# Patient Record
Sex: FEMALE | Race: WHITE | NOT HISPANIC OR LATINO
[De-identification: names, ages, dates, MRNs, and addresses within clinical notes are randomized per-mention and may not be internally consistent; named-entity substitution may affect disease eponyms.]

---

## 2018-01-15 ENCOUNTER — TRANSCRIPTION ENCOUNTER (OUTPATIENT)
Age: 38
End: 2018-01-15

## 2018-03-13 ENCOUNTER — APPOINTMENT (OUTPATIENT)
Dept: OBGYN | Facility: CLINIC | Age: 38
End: 2018-03-13

## 2019-05-03 ENCOUNTER — APPOINTMENT (OUTPATIENT)
Dept: INTERNAL MEDICINE | Facility: CLINIC | Age: 39
End: 2019-05-03
Payer: COMMERCIAL

## 2019-05-03 VITALS
OXYGEN SATURATION: 98 % | HEIGHT: 63 IN | BODY MASS INDEX: 18.78 KG/M2 | SYSTOLIC BLOOD PRESSURE: 110 MMHG | WEIGHT: 106 LBS | HEART RATE: 75 BPM | DIASTOLIC BLOOD PRESSURE: 70 MMHG

## 2019-05-03 DIAGNOSIS — Z63.4 DISAPPEARANCE AND DEATH OF FAMILY MEMBER: ICD-10-CM

## 2019-05-03 LAB
ALBUMIN SERPL ELPH-MCNC: 5 G/DL
ALP BLD-CCNC: 40 U/L
ALT SERPL-CCNC: 13 U/L
ANION GAP SERPL CALC-SCNC: 14 MMOL/L
AST SERPL-CCNC: 15 U/L
BASOPHILS # BLD AUTO: 0.02 K/UL
BASOPHILS NFR BLD AUTO: 0.4 %
BILIRUB SERPL-MCNC: 0.6 MG/DL
BUN SERPL-MCNC: 13 MG/DL
CALCIUM SERPL-MCNC: 10.5 MG/DL
CHLORIDE SERPL-SCNC: 102 MMOL/L
CHOLEST SERPL-MCNC: 205 MG/DL
CHOLEST/HDLC SERPL: 2.6 RATIO
CO2 SERPL-SCNC: 23 MMOL/L
CREAT SERPL-MCNC: 0.9 MG/DL
EOSINOPHIL # BLD AUTO: 0.13 K/UL
EOSINOPHIL NFR BLD AUTO: 2.6 %
GLUCOSE SERPL-MCNC: 79 MG/DL
HCT VFR BLD CALC: 41.2 %
HDLC SERPL-MCNC: 78 MG/DL
HGB BLD-MCNC: 13.9 G/DL
IMM GRANULOCYTES NFR BLD AUTO: 0 %
LDLC SERPL CALC-MCNC: 117 MG/DL
LYMPHOCYTES # BLD AUTO: 1.56 K/UL
LYMPHOCYTES NFR BLD AUTO: 30.6 %
MAN DIFF?: NORMAL
MCHC RBC-ENTMCNC: 30.6 PG
MCHC RBC-ENTMCNC: 33.7 GM/DL
MCV RBC AUTO: 90.7 FL
MONOCYTES # BLD AUTO: 0.39 K/UL
MONOCYTES NFR BLD AUTO: 7.7 %
NEUTROPHILS # BLD AUTO: 2.99 K/UL
NEUTROPHILS NFR BLD AUTO: 58.7 %
PLATELET # BLD AUTO: 231 K/UL
POTASSIUM SERPL-SCNC: 4.9 MMOL/L
PROT SERPL-MCNC: 7.8 G/DL
RBC # BLD: 4.54 M/UL
RBC # FLD: 13.1 %
SODIUM SERPL-SCNC: 139 MMOL/L
TRIGL SERPL-MCNC: 51 MG/DL
TSH SERPL-ACNC: 0.83 UIU/ML
WBC # FLD AUTO: 5.09 K/UL

## 2019-05-03 PROCEDURE — 99395 PREV VISIT EST AGE 18-39: CPT

## 2019-05-03 PROCEDURE — G0444 DEPRESSION SCREEN ANNUAL: CPT

## 2019-05-03 SDOH — SOCIAL STABILITY - SOCIAL INSECURITY: DISSAPEARANCE AND DEATH OF FAMILY MEMBER: Z63.4

## 2019-05-03 NOTE — COUNSELING
[Healthy eating counseling provided] : healthy eating [ - Annual Depression Screening] : Annual Depression Screening

## 2019-05-03 NOTE — PHYSICAL EXAM
[No Acute Distress] : no acute distress [Well Developed] : well developed [Well Nourished] : well nourished [Normal Sclera/Conjunctiva] : normal sclera/conjunctiva [PERRL] : pupils equal round and reactive to light [Well-Appearing] : well-appearing [EOMI] : extraocular movements intact [Normal Outer Ear/Nose] : the outer ears and nose were normal in appearance [No JVD] : no jugular venous distention [Supple] : supple [Normal Oropharynx] : the oropharynx was normal [No Lymphadenopathy] : no lymphadenopathy [Thyroid Normal, No Nodules] : the thyroid was normal and there were no nodules present [No Respiratory Distress] : no respiratory distress  [Clear to Auscultation] : lungs were clear to auscultation bilaterally [Normal Rate] : normal rate  [No Accessory Muscle Use] : no accessory muscle use [Regular Rhythm] : with a regular rhythm [No Murmur] : no murmur heard [Normal S1, S2] : normal S1 and S2 [No Carotid Bruits] : no carotid bruits [No Abdominal Bruit] : a ~M bruit was not heard ~T in the abdomen [No Varicosities] : no varicosities [Pedal Pulses Present] : the pedal pulses are present [No Edema] : there was no peripheral edema [No Extremity Clubbing/Cyanosis] : no extremity clubbing/cyanosis [No Palpable Aorta] : no palpable aorta [Normal Appearance] : normal in appearance [No Nipple Discharge] : no nipple discharge [No Axillary Lymphadenopathy] : no axillary lymphadenopathy [Non Tender] : non-tender [Soft] : abdomen soft [No Masses] : no abdominal mass palpated [Non-distended] : non-distended [Normal Bowel Sounds] : normal bowel sounds [No HSM] : no HSM [Normal Posterior Cervical Nodes] : no posterior cervical lymphadenopathy [Normal Anterior Cervical Nodes] : no anterior cervical lymphadenopathy [No Spinal Tenderness] : no spinal tenderness [No CVA Tenderness] : no CVA  tenderness [No Joint Swelling] : no joint swelling [Grossly Normal Strength/Tone] : grossly normal strength/tone [No Rash] : no rash [Coordination Grossly Intact] : coordination grossly intact [Normal Gait] : normal gait [Deep Tendon Reflexes (DTR)] : deep tendon reflexes were 2+ and symmetric [No Focal Deficits] : no focal deficits [Normal Affect] : the affect was normal [Normal Insight/Judgement] : insight and judgment were intact

## 2019-05-05 LAB
HCV AB SER QL: NONREACTIVE
HCV S/CO RATIO: 0.12 S/CO
HIV1+2 AB SPEC QL IA.RAPID: NONREACTIVE

## 2019-05-06 ENCOUNTER — TRANSCRIPTION ENCOUNTER (OUTPATIENT)
Age: 39
End: 2019-05-06

## 2019-05-22 NOTE — ASSESSMENT
[FreeTextEntry1] :         \par        1. HCM-  HIV testing-- tested in past (pregnancy) and negative- offered and accepted\par                 immunizations ok- had Tdap at daughter's pediatrician in 3/12- and with last pregnancy 2013\par                 pap smear recently-h/o HPV- since teenage years- reports neg PAP and HPV 3/19- Dr Denney\par                 derm f/u routine   \par                  requests labs\par     \par       2. 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 ? kidney issue- stones?? Medullary sponge kidney- will retrieve records from Dr Jesus\par \par \par    f/u 1-2 years or prn

## 2019-05-22 NOTE — HISTORY OF PRESENT ILLNESS
[de-identified] : 39 yo generally healthy last seen almost 3 years ago. At time  had recently  leaving patient with two young children.\par \par Appt made for routine purposes. Feels well.  No complaints

## 2019-05-22 NOTE — HEALTH RISK ASSESSMENT
[Excellent] : ~his/her~  mood as  excellent [0] : 2) Feeling down, depressed, or hopeless: Not at all (0) [Patient reported PAP Smear was normal] : Patient reported PAP Smear was normal [HIV Test offered] : HIV Test offered [Hepatitis C test offered] : Hepatitis C test offered [None] : None [With Family] : lives with family [Graduate School] : graduate school [# Of Children ___] : has [unfilled] children [Sexually Active] : sexually active [Fully functional (bathing, dressing, toileting, transferring, walking, feeding)] : Fully functional (bathing, dressing, toileting, transferring, walking, feeding) [Feels Safe at Home] : Feels safe at home [Fully functional (using the telephone, shopping, preparing meals, housekeeping, doing laundry, using] : Fully functional and needs no help or supervision to perform IADLs (using the telephone, shopping, preparing meals, housekeeping, doing laundry, using transportation, managing medications and managing finances) [Smoke Detector] : smoke detector [Seat Belt] :  uses seat belt [Designated Healthcare Proxy] : Designated healthcare proxy [Name: ___] : Health Care Proxy's Name: [unfilled]  [I will adhere to the patient's wishes as expressed in the advance directive except as noted below.] : I will adhere to the patient's wishes as expressed in the advance directive except as noted below [] : No [de-identified] : No ED or hosp [de-identified] : Gyn- Dr Denney; ENT- Dr Roberts- sinus; Dr Jesus- caren acosta; Derm [de-identified] : occasional glass of wine at night [de-identified] : Swims twice weekly- [place] [[id number]] : 0 [Change in mental status noted] : No change in mental status noted [Language] : denies difficulty with language [Behavior] : denies difficulty with behavior [Learning/Retaining New Information] : denies difficulty learning/retaining new information [Handling Complex Tasks] : denies difficulty handling complex tasks [Reasoning] : denies difficulty with reasoning [Spatial Ability and Orientation] : denies difficulty with spatial ability and orientation [Reports changes in hearing] : Reports no changes in hearing [Reports changes in vision] : Reports no changes in vision [Reports changes in dental health] : Reports no changes in dental health [Guns at Home] : no guns at home [PapSmearComments] : March 2019- Dr Denney [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

## 2020-05-13 ENCOUNTER — APPOINTMENT (OUTPATIENT)
Dept: INTERNAL MEDICINE | Facility: CLINIC | Age: 40
End: 2020-05-13
Payer: COMMERCIAL

## 2020-05-13 PROCEDURE — 99214 OFFICE O/P EST MOD 30 MIN: CPT | Mod: 95

## 2020-05-13 NOTE — REASON FOR VISIT
[Other Location: e.g. Home (Enter Location, City,State)___] : at [unfilled] [Patient] : the patient [Annual Wellness Visit] : an annual wellness visit [Home] : at home, [unfilled] , at the time of the visit. [Self] : self

## 2020-05-21 LAB
BASOPHILS # BLD AUTO: 0.02 K/UL
BASOPHILS NFR BLD AUTO: 0.4 %
EOSINOPHIL # BLD AUTO: 0.14 K/UL
EOSINOPHIL NFR BLD AUTO: 2.8 %
HCT VFR BLD CALC: 42.1 %
HGB BLD-MCNC: 13.5 G/DL
IMM GRANULOCYTES NFR BLD AUTO: 0.2 %
LYMPHOCYTES # BLD AUTO: 1.53 K/UL
LYMPHOCYTES NFR BLD AUTO: 30.1 %
MAN DIFF?: NORMAL
MCHC RBC-ENTMCNC: 30.5 PG
MCHC RBC-ENTMCNC: 32.1 GM/DL
MCV RBC AUTO: 95 FL
MONOCYTES # BLD AUTO: 0.42 K/UL
MONOCYTES NFR BLD AUTO: 8.3 %
NEUTROPHILS # BLD AUTO: 2.96 K/UL
NEUTROPHILS NFR BLD AUTO: 58.2 %
PLATELET # BLD AUTO: 201 K/UL
RBC # BLD: 4.43 M/UL
RBC # FLD: 13.5 %
WBC # FLD AUTO: 5.08 K/UL

## 2020-05-22 LAB
ALBUMIN SERPL ELPH-MCNC: 4.6 G/DL
ALP BLD-CCNC: 29 U/L
ALT SERPL-CCNC: 14 U/L
ANION GAP SERPL CALC-SCNC: 13 MMOL/L
AST SERPL-CCNC: 17 U/L
BILIRUB SERPL-MCNC: 0.6 MG/DL
BUN SERPL-MCNC: 13 MG/DL
CALCIUM SERPL-MCNC: 9.6 MG/DL
CHLORIDE SERPL-SCNC: 103 MMOL/L
CHOLEST SERPL-MCNC: 194 MG/DL
CHOLEST/HDLC SERPL: 2.3 RATIO
CO2 SERPL-SCNC: 24 MMOL/L
CREAT SERPL-MCNC: 0.82 MG/DL
GLUCOSE SERPL-MCNC: 89 MG/DL
HDLC SERPL-MCNC: 85 MG/DL
LDLC SERPL CALC-MCNC: 96 MG/DL
POTASSIUM SERPL-SCNC: 4.5 MMOL/L
PROT SERPL-MCNC: 6.9 G/DL
SARS-COV-2 IGG SERPL IA-ACNC: <0.1 INDEX
SARS-COV-2 IGG SERPL QL IA: NEGATIVE
SODIUM SERPL-SCNC: 139 MMOL/L
T4 FREE SERPL-MCNC: 1 NG/DL
TRIGL SERPL-MCNC: 67 MG/DL
TSH SERPL-ACNC: 1.9 UIU/ML

## 2020-05-22 NOTE — ASSESSMENT
[FreeTextEntry1] :  1. HCM- HIV testing-- tested in past (pregnancy) and May 2019 as routine\par  immunizations ok- had Tdap at daughter's pediatrician in 3/12- and with last pregnancy 2013\par  pap smear recently-h/o HPV- since teenage years- reports neg PAP and HPV 3/19- Dr Denney- upcoming appt for August 2020- would consider OCP for heavy menses- check TSH and CBC\par  derm f/u routine \par Too young for mammo\par  \par  2. 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 / Kidney stones- ? Medullary SPonge Kidney- isue discussed Has been asx Does drink a lot of water, etc- monitor- was advised sono but not interested in f/u\par \par f/u on eyear or prn- will order labs and call when results received\par \par

## 2020-05-22 NOTE — REVIEW OF SYSTEMS
[Negative] : Heme/Lymph [Nasal Discharge] : nasal discharge [FreeTextEntry4] : occas congestoin due to allergies [FreeTextEntry8] : menses heavier than usual first few days of month- considering OCP [FreeTextEntry1] : Has had two mammos in life- gyn advised

## 2020-05-22 NOTE — HEALTH RISK ASSESSMENT
[Excellent] : ~his/her~  mood as  excellent [0] : 2) Feeling down, depressed, or hopeless: Not at all (0) [Patient reported PAP Smear was normal] : Patient reported PAP Smear was normal [HIV Test offered] : HIV Test offered [Hepatitis C test offered] : Hepatitis C test offered [None] : None [With Family] : lives with family [Graduate School] : graduate school [# Of Children ___] : has [unfilled] children [Sexually Active] : sexually active [Fully functional (using the telephone, shopping, preparing meals, housekeeping, doing laundry, using] : Fully functional and needs no help or supervision to perform IADLs (using the telephone, shopping, preparing meals, housekeeping, doing laundry, using transportation, managing medications and managing finances) [Fully functional (bathing, dressing, toileting, transferring, walking, feeding)] : Fully functional (bathing, dressing, toileting, transferring, walking, feeding) [Feels Safe at Home] : Feels safe at home [Smoke Detector] : smoke detector [Seat Belt] :  uses seat belt [Designated Healthcare Proxy] : Designated healthcare proxy [Name: ___] : Health Care Proxy's Name: [unfilled]  [I will adhere to the patient's wishes as expressed in the advance directive except as noted below.] : I will adhere to the patient's wishes as expressed in the advance directive except as noted below [4 or more  times a week (4 pts)] : 4 or more  times a week (4 points) [1 or 2 (0 pts)] : 1 or 2 (0 points) [No] : In the past 12 months have you used drugs other than those required for medical reasons? No [Never (0 pts)] : Never (0 points) [No falls in past year] : Patient reported no falls in the past year [Reports normal functional visual acuity (ie: able to read med bottle)] : Reports normal functional visual acuity [Reviewed no changes] : Reviewed no changes [] : No [de-identified] : No ED or hosp [de-identified] : Gyn- Dr Denney 3/19 last- upcoming apt;  Dr Rasmussen ENT; Derm Dr Sheriff- routine wurveillance [de-identified] : occasional glass of wine at night- up to once nightly [de-identified] : Rowing MaCHING sWIMS WHEN ABLE [[id number]] : 0 [de-identified] : Healthy- weight stable by report [Change in mental status noted] : No change in mental status noted [Language] : denies difficulty with language [Behavior] : denies difficulty with behavior [Learning/Retaining New Information] : denies difficulty learning/retaining new information [Handling Complex Tasks] : denies difficulty handling complex tasks [Reasoning] : denies difficulty with reasoning [Spatial Ability and Orientation] : denies difficulty with spatial ability and orientation [Reports changes in hearing] : Reports no changes in hearing [Reports changes in vision] : Reports no changes in vision [Reports changes in dental health] : Reports no changes in dental health [Guns at Home] : no guns at home [PapSmearComments] : March 2019- Dr Denney [MammogramComments] : n/a [BoneDensityComments] : n/a [ColonoscopyComments] : n/a [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

## 2020-05-22 NOTE — HISTORY OF PRESENT ILLNESS
[de-identified] : gENERALLY HEALTHY 40 YO LAST SEEN ONE YEAR AGO FOR ROUTINE WELLNESS VISIT/CPE\par \par Patient has been generally well without any significant intercurrent issues or problems. Adherent with medications as noted without side effects. Appetite good and weight reportedly stable. Active with good exercise tolerance. No sx of chest pain, sob, palpitations, orthopnea, PND, PIEDRA, edema, lightheadedness..\par

## 2020-08-07 ENCOUNTER — TRANSCRIPTION ENCOUNTER (OUTPATIENT)
Age: 40
End: 2020-08-07

## 2020-12-28 ENCOUNTER — TRANSCRIPTION ENCOUNTER (OUTPATIENT)
Age: 40
End: 2020-12-28

## 2021-01-19 ENCOUNTER — TRANSCRIPTION ENCOUNTER (OUTPATIENT)
Age: 41
End: 2021-01-19

## 2021-03-18 ENCOUNTER — NON-APPOINTMENT (OUTPATIENT)
Age: 41
End: 2021-03-18

## 2021-03-19 ENCOUNTER — APPOINTMENT (OUTPATIENT)
Dept: INTERNAL MEDICINE | Facility: CLINIC | Age: 41
End: 2021-03-19
Payer: COMMERCIAL

## 2021-03-19 VITALS
SYSTOLIC BLOOD PRESSURE: 102 MMHG | OXYGEN SATURATION: 99 % | HEART RATE: 64 BPM | HEIGHT: 63 IN | BODY MASS INDEX: 19.31 KG/M2 | DIASTOLIC BLOOD PRESSURE: 60 MMHG | WEIGHT: 109 LBS

## 2021-03-19 VITALS — SYSTOLIC BLOOD PRESSURE: 108 MMHG | HEART RATE: 60 BPM | RESPIRATION RATE: 12 BRPM | DIASTOLIC BLOOD PRESSURE: 70 MMHG

## 2021-03-19 LAB
BASOPHILS # BLD AUTO: 0.03 K/UL
BASOPHILS NFR BLD AUTO: 0.6 %
EOSINOPHIL # BLD AUTO: 0.09 K/UL
EOSINOPHIL NFR BLD AUTO: 1.8 %
HCT VFR BLD CALC: 42.3 %
HGB BLD-MCNC: 14.1 G/DL
IMM GRANULOCYTES NFR BLD AUTO: 0.2 %
LYMPHOCYTES # BLD AUTO: 1.91 K/UL
LYMPHOCYTES NFR BLD AUTO: 37.5 %
MAN DIFF?: NORMAL
MCHC RBC-ENTMCNC: 30.5 PG
MCHC RBC-ENTMCNC: 33.3 GM/DL
MCV RBC AUTO: 91.4 FL
MONOCYTES # BLD AUTO: 0.38 K/UL
MONOCYTES NFR BLD AUTO: 7.5 %
NEUTROPHILS # BLD AUTO: 2.67 K/UL
NEUTROPHILS NFR BLD AUTO: 52.4 %
PLATELET # BLD AUTO: 200 K/UL
RBC # BLD: 4.63 M/UL
RBC # FLD: 12.9 %
WBC # FLD AUTO: 5.09 K/UL

## 2021-03-19 PROCEDURE — G0444 DEPRESSION SCREEN ANNUAL: CPT | Mod: NC,59

## 2021-03-19 PROCEDURE — 99072 ADDL SUPL MATRL&STAF TM PHE: CPT

## 2021-03-19 PROCEDURE — 99395 PREV VISIT EST AGE 18-39: CPT

## 2021-03-19 NOTE — PHYSICAL EXAM
[No Acute Distress] : no acute distress [Well Nourished] : well nourished [Well Developed] : well developed [Well-Appearing] : well-appearing [Normal Sclera/Conjunctiva] : normal sclera/conjunctiva [PERRL] : pupils equal round and reactive to light [EOMI] : extraocular movements intact [Normal Outer Ear/Nose] : the outer ears and nose were normal in appearance [Normal Oropharynx] : the oropharynx was normal [No JVD] : no jugular venous distention [No Lymphadenopathy] : no lymphadenopathy [Supple] : supple [Thyroid Normal, No Nodules] : the thyroid was normal and there were no nodules present [No Respiratory Distress] : no respiratory distress  [No Accessory Muscle Use] : no accessory muscle use [Clear to Auscultation] : lungs were clear to auscultation bilaterally [Normal Rate] : normal rate  [Regular Rhythm] : with a regular rhythm [Normal S1, S2] : normal S1 and S2 [No Murmur] : no murmur heard [No Carotid Bruits] : no carotid bruits [No Abdominal Bruit] : a ~M bruit was not heard ~T in the abdomen [No Varicosities] : no varicosities [Pedal Pulses Present] : the pedal pulses are present [No Edema] : there was no peripheral edema [No Palpable Aorta] : no palpable aorta [No Extremity Clubbing/Cyanosis] : no extremity clubbing/cyanosis [Normal] : no carotid or abdominal bruits heard, no varicosities, pedal pulses are present, no peripheral edema, no extremity clubbing or cyanosis and no palpable aorta [Normal Appearance] : normal in appearance [No Nipple Discharge] : no nipple discharge [No Axillary Lymphadenopathy] : no axillary lymphadenopathy [Soft] : abdomen soft [Non Tender] : non-tender [Non-distended] : non-distended [No Masses] : no abdominal mass palpated [No HSM] : no HSM [Normal Bowel Sounds] : normal bowel sounds [Normal Posterior Cervical Nodes] : no posterior cervical lymphadenopathy [Normal Anterior Cervical Nodes] : no anterior cervical lymphadenopathy [No CVA Tenderness] : no CVA  tenderness [No Spinal Tenderness] : no spinal tenderness [No Joint Swelling] : no joint swelling [Grossly Normal Strength/Tone] : grossly normal strength/tone [No Rash] : no rash [Coordination Grossly Intact] : coordination grossly intact [No Focal Deficits] : no focal deficits [Normal Gait] : normal gait [Deep Tendon Reflexes (DTR)] : deep tendon reflexes were 2+ and symmetric [Normal Affect] : the affect was normal [Normal Insight/Judgement] : insight and judgment were intact

## 2021-03-22 ENCOUNTER — TRANSCRIPTION ENCOUNTER (OUTPATIENT)
Age: 41
End: 2021-03-22

## 2021-03-22 LAB
ALBUMIN SERPL ELPH-MCNC: 4.8 G/DL
ALP BLD-CCNC: 34 U/L
ALT SERPL-CCNC: 10 U/L
ANION GAP SERPL CALC-SCNC: 10 MMOL/L
AST SERPL-CCNC: 17 U/L
BILIRUB SERPL-MCNC: 0.5 MG/DL
BUN SERPL-MCNC: 17 MG/DL
CALCIUM SERPL-MCNC: 10 MG/DL
CHLORIDE SERPL-SCNC: 101 MMOL/L
CHOLEST SERPL-MCNC: 203 MG/DL
CO2 SERPL-SCNC: 26 MMOL/L
CREAT SERPL-MCNC: 0.88 MG/DL
GLUCOSE SERPL-MCNC: 90 MG/DL
HDLC SERPL-MCNC: 93 MG/DL
LDLC SERPL CALC-MCNC: 102 MG/DL
NONHDLC SERPL-MCNC: 110 MG/DL
POTASSIUM SERPL-SCNC: 4.7 MMOL/L
PROT SERPL-MCNC: 7.4 G/DL
SODIUM SERPL-SCNC: 137 MMOL/L
TRIGL SERPL-MCNC: 39 MG/DL
TSH SERPL-ACNC: 1.49 UIU/ML

## 2021-03-22 NOTE — REVIEW OF SYSTEMS
[Nasal Discharge] : nasal discharge [Negative] : Heme/Lymph [FreeTextEntry4] : occas congestion due to allergies [FreeTextEntry1] : Has had two mammos in life- gyn advised

## 2021-03-22 NOTE — HISTORY OF PRESENT ILLNESS
[de-identified] : Last seen less than one year ago virtually. Patient has been generally well without any significant intercurrent issues or problems. Adherent with medications as noted without side effects. Appetite good and weight reportedly stable. Active with good exercise tolerance. 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

## 2021-03-22 NOTE — ASSESSMENT
[FreeTextEntry1] : 1. HCM- HIV testing-- tested in past (pregnancy) and May 2019 as routine- no additional risk\par  immunizations ok- had Tdap at daughter's pediatrician in 3/12- and with last pregnancy 2013- and covid vaccine and flu shot this season\par  pap smear recently-h/o HPV- since teenage years- reports neg PAP and HPV 3/19- Dr Denney- and again June 2020\par  derm f/u routine \par Too young for mammo- start at ae 42\par  \par  2. 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 / Kidney stones- ? Medullary SPonge Kidney- isue discussed Has been asx Does drink a lot of water, etc- monitor- was advised sono but not interested in f/u\par \par f/u one year or prn- will order labs and call when results received\par \par

## 2021-03-22 NOTE — HEALTH RISK ASSESSMENT
[Excellent] : ~his/her~  mood as  excellent [4 or more  times a week (4 pts)] : 4 or more  times a week (4 points) [1 or 2 (0 pts)] : 1 or 2 (0 points) [Never (0 pts)] : Never (0 points) [No] : In the past 12 months have you used drugs other than those required for medical reasons? No [No falls in past year] : Patient reported no falls in the past year [0] : 2) Feeling down, depressed, or hopeless: Not at all (0) [Patient reported PAP Smear was normal] : Patient reported PAP Smear was normal [None] : None [With Family] : lives with family [Graduate School] : graduate school [# Of Children ___] : has [unfilled] children [Sexually Active] : sexually active [Feels Safe at Home] : Feels safe at home [Fully functional (bathing, dressing, toileting, transferring, walking, feeding)] : Fully functional (bathing, dressing, toileting, transferring, walking, feeding) [Fully functional (using the telephone, shopping, preparing meals, housekeeping, doing laundry, using] : Fully functional and needs no help or supervision to perform IADLs (using the telephone, shopping, preparing meals, housekeeping, doing laundry, using transportation, managing medications and managing finances) [Reports normal functional visual acuity (ie: able to read med bottle)] : Reports normal functional visual acuity [Smoke Detector] : smoke detector [Seat Belt] :  uses seat belt [Reviewed no changes] : Reviewed no changes [Designated Healthcare Proxy] : Designated healthcare proxy [Name: ___] : Health Care Proxy's Name: [unfilled]  [I will adhere to the patient's wishes as expressed in the advance directive except as noted below.] : I will adhere to the patient's wishes as expressed in the advance directive except as noted below [HIV test declined] : HIV test declined [Hepatitis C test declined] : Hepatitis C test declined [FreeTextEntry1] : None [] : No [de-identified] : No ED or hosp [de-identified] : Gyn- Dr Denney June 2020 last; Derm Dr Sheriff- routine Aug 202 [de-identified] : occasional glass of wine at night- up to once nightly [de-identified] : [employer]- daily 30 minutes [de-identified] : Healthy- weight stable by report- exceelent exercise [[id number]] : 0 [Change in mental status noted] : No change in mental status noted [Language] : denies difficulty with language [Behavior] : denies difficulty with behavior [Learning/Retaining New Information] : denies difficulty learning/retaining new information [Handling Complex Tasks] : denies difficulty handling complex tasks [Reasoning] : denies difficulty with reasoning [Spatial Ability and Orientation] : denies difficulty with spatial ability and orientation [Reports changes in hearing] : Reports no changes in hearing [Reports changes in vision] : Reports no changes in vision [Reports changes in dental health] : Reports no changes in dental health [Guns at Home] : no guns at home [Travel to Developing Areas] : does not  travel to developing areas [MammogramComments] : n/a [PapSmearComments] : June 2020- Dr Denney [BoneDensityComments] : n/a [ColonoscopyComments] : n/a [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

## 2021-06-24 ENCOUNTER — TRANSCRIPTION ENCOUNTER (OUTPATIENT)
Age: 41
End: 2021-06-24

## 2022-05-13 ENCOUNTER — APPOINTMENT (OUTPATIENT)
Dept: INTERNAL MEDICINE | Facility: CLINIC | Age: 42
End: 2022-05-13
Payer: COMMERCIAL

## 2022-05-13 ENCOUNTER — LABORATORY RESULT (OUTPATIENT)
Age: 42
End: 2022-05-13

## 2022-05-13 VITALS
WEIGHT: 112 LBS | HEIGHT: 63 IN | OXYGEN SATURATION: 98 % | SYSTOLIC BLOOD PRESSURE: 106 MMHG | DIASTOLIC BLOOD PRESSURE: 70 MMHG | BODY MASS INDEX: 19.84 KG/M2 | HEART RATE: 63 BPM

## 2022-05-13 DIAGNOSIS — Z11.59 ENCOUNTER FOR SCREENING FOR OTHER VIRAL DISEASES: ICD-10-CM

## 2022-05-13 LAB
ALBUMIN SERPL ELPH-MCNC: 5 G/DL
ALP BLD-CCNC: 42 U/L
ALT SERPL-CCNC: 14 U/L
ANION GAP SERPL CALC-SCNC: 13 MMOL/L
AST SERPL-CCNC: 19 U/L
BILIRUB SERPL-MCNC: 0.4 MG/DL
BUN SERPL-MCNC: 13 MG/DL
CALCIUM SERPL-MCNC: 10.3 MG/DL
CHLORIDE SERPL-SCNC: 100 MMOL/L
CO2 SERPL-SCNC: 25 MMOL/L
CREAT SERPL-MCNC: 0.96 MG/DL
EGFR: 77 ML/MIN/1.73M2
GLUCOSE SERPL-MCNC: 93 MG/DL
POTASSIUM SERPL-SCNC: 4.3 MMOL/L
PROT SERPL-MCNC: 7.5 G/DL
SODIUM SERPL-SCNC: 138 MMOL/L
TSH SERPL-ACNC: 0.62 UIU/ML

## 2022-05-13 PROCEDURE — 99395 PREV VISIT EST AGE 18-39: CPT

## 2022-05-16 ENCOUNTER — TRANSCRIPTION ENCOUNTER (OUTPATIENT)
Age: 42
End: 2022-05-16

## 2022-05-16 LAB
BASOPHILS # BLD AUTO: 0 K/UL
BASOPHILS NFR BLD AUTO: 0 %
CHOLEST SERPL-MCNC: 204 MG/DL
EOSINOPHIL # BLD AUTO: 0.25 K/UL
EOSINOPHIL NFR BLD AUTO: 6 %
HCT VFR BLD CALC: 41.5 %
HDLC SERPL-MCNC: 79 MG/DL
HGB BLD-MCNC: 13.8 G/DL
LDLC SERPL CALC-MCNC: 106 MG/DL
LYMPHOCYTES # BLD AUTO: 1.18 K/UL
LYMPHOCYTES NFR BLD AUTO: 28.2 %
MAN DIFF?: NORMAL
MCHC RBC-ENTMCNC: 30.8 PG
MCHC RBC-ENTMCNC: 33.3 GM/DL
MCV RBC AUTO: 92.6 FL
MONOCYTES # BLD AUTO: 0.53 K/UL
MONOCYTES NFR BLD AUTO: 12.8 %
NEUTROPHILS # BLD AUTO: 2.07 K/UL
NEUTROPHILS NFR BLD AUTO: 49.6 %
NONHDLC SERPL-MCNC: 125 MG/DL
PLATELET # BLD AUTO: 190 K/UL
RBC # BLD: 4.48 M/UL
RBC # FLD: 13.2 %
TRIGL SERPL-MCNC: 91 MG/DL
WBC # FLD AUTO: 4.17 K/UL

## 2022-05-16 NOTE — ASSESSMENT
[FreeTextEntry1] : \par 1. HCM- HIV testing-- tested in past (pregnancy) and May 2019 as routine- no additional risk\par  immunizations ok- had Tdap at daughter's pediatrician in 3/12- and with last pregnancy 2013- and covid vaccine + booster and flu shot this season\par  pap smear recently-h/o HPV- since teenage years- reports neg PAP and HPV 3/19- Dr Denney- and again June 2022- updoming\par  derm f/u routine every summer\par Mammo- Gyn ordered since aged 39- advised yearly til aged 52- order placed- due summer, 2022\par  \par \par 2. Kidney stones- ? Medullary Sponge Kidney- isue discussed Has been asx Does drink a lot of water, etc- monitor- was advised sono but not interested in f/u\par \par f/u one year or prn- will order labs and call when results received\par \par

## 2022-05-16 NOTE — HEALTH RISK ASSESSMENT
[Excellent] : ~his/her~  mood as  excellent [4 or more  times a week (4 pts)] : 4 or more  times a week (4 points) [1 or 2 (0 pts)] : 1 or 2 (0 points) [Never (0 pts)] : Never (0 points) [No] : In the past 12 months have you used drugs other than those required for medical reasons? No [No falls in past year] : Patient reported no falls in the past year [0] : 2) Feeling down, depressed, or hopeless: Not at all (0) [Patient reported PAP Smear was normal] : Patient reported PAP Smear was normal [HIV test declined] : HIV test declined [Hepatitis C test declined] : Hepatitis C test declined [None] : None [With Family] : lives with family [Graduate School] : graduate school [# Of Children ___] : has [unfilled] children [Sexually Active] : sexually active [Feels Safe at Home] : Feels safe at home [Fully functional (bathing, dressing, toileting, transferring, walking, feeding)] : Fully functional (bathing, dressing, toileting, transferring, walking, feeding) [Fully functional (using the telephone, shopping, preparing meals, housekeeping, doing laundry, using] : Fully functional and needs no help or supervision to perform IADLs (using the telephone, shopping, preparing meals, housekeeping, doing laundry, using transportation, managing medications and managing finances) [Reports normal functional visual acuity (ie: able to read med bottle)] : Reports normal functional visual acuity [Smoke Detector] : smoke detector [Seat Belt] :  uses seat belt [Never] : Never [PHQ-2 Negative - No further assessment needed] : PHQ-2 Negative - No further assessment needed [With Patient/Caregiver] : , with patient/caregiver [Reviewed no changes] : Reviewed, no changes [Designated Healthcare Proxy] : Designated healthcare proxy [Name: ___] : Health Care Proxy's Name: [unfilled]  [Time Spent: ___ minutes] : Time Spent: [unfilled] minutes [FreeTextEntry1] : None [de-identified] : No ED or hosp or urgicenter [de-identified] : Gyn- Dr Denney June 2021 last; Derm Dr Sheriff- routine Aug 2021 [de-identified] : occasional glass of wine at night- up to once nightly [de-identified] : [employer]- daily 30 minutes [de-identified] : Healthy- weight stable by report- exceelent exercise [[id number]] : 0 [Change in mental status noted] : No change in mental status noted [Language] : denies difficulty with language [Behavior] : denies difficulty with behavior [Learning/Retaining New Information] : denies difficulty learning/retaining new information [Handling Complex Tasks] : denies difficulty handling complex tasks [Reasoning] : denies difficulty with reasoning [Spatial Ability and Orientation] : denies difficulty with spatial ability and orientation [Reports changes in hearing] : Reports no changes in hearing [Reports changes in vision] : Reports no changes in vision [Reports changes in dental health] : Reports no changes in dental health [Guns at Home] : no guns at home [Travel to Developing Areas] : does not  travel to developing areas [MammogramComments] : n/a [PapSmearComments] : June 2020- Dr Denney [BoneDensityComments] : n/a [ColonoscopyComments] : n/a [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

## 2022-07-01 ENCOUNTER — TRANSCRIPTION ENCOUNTER (OUTPATIENT)
Age: 42
End: 2022-07-01

## 2022-07-27 ENCOUNTER — TRANSCRIPTION ENCOUNTER (OUTPATIENT)
Age: 42
End: 2022-07-27

## 2022-07-28 ENCOUNTER — TRANSCRIPTION ENCOUNTER (OUTPATIENT)
Age: 42
End: 2022-07-28

## 2022-07-28 LAB — PINWORM EXAM: NORMAL

## 2022-10-04 ENCOUNTER — TRANSCRIPTION ENCOUNTER (OUTPATIENT)
Age: 42
End: 2022-10-04

## 2022-12-14 ENCOUNTER — TRANSCRIPTION ENCOUNTER (OUTPATIENT)
Age: 42
End: 2022-12-14

## 2022-12-19 NOTE — PHYSICAL EXAM
[de-identified] : WDWN in NAD\par HEENT:  unremarkable\par Neck:  supple, no JVD, no LN\par Lungs:  CTA B/L, no W/R/R\par Heart:  Reg rate, +S1S2, no M/R/G\par Abdomen:  soft, NT, ND, +BS, no masses, no HS-megaly\par Genital: No pubic or genital lesions noted.\par Ext:  no C/C/E\par Neuro:  no focal deficits

## 2022-12-19 NOTE — REVIEW OF SYSTEMS
[FreeTextEntry2] : Constitutional:  no fever and no chills. \par Eyes:  no discharge. \par HEENT:  no earache. \par Cardiovascular:  no chest pain, no palpitations and no lower extremity edema. \par Respiratory:  no shortness of breath, no wheezing and no cough. \par Gastrointestinal:  no abdominal pain, no nausea and no vomiting. \par Genitourinary:  no dysuria. \par Musculoskeletal:  no joint pain. \par Integumentary:  no itching. \par Neurological:  no headache. \par Psychiatric:  not suicidal. \par Hematologic/Lymphatic:  no easy bleeding. [FreeTextEntry7] : see hpi

## 2022-12-19 NOTE — HISTORY OF PRESENT ILLNESS
[FreeTextEntry8] : BIANCA BUENROSTRO  is a 42 year old female  with history of  HLD, nephrolithiasis, HPV infection presented today for nausea, headache. \par Pt reported home COVID tested negative. \par

## 2022-12-22 ENCOUNTER — APPOINTMENT (OUTPATIENT)
Dept: INTERNAL MEDICINE | Facility: CLINIC | Age: 42
End: 2022-12-22

## 2022-12-22 NOTE — HISTORY OF PRESENT ILLNESS
[FreeTextEntry1] : nausea [de-identified] : BIANCA BUENROSTRO  is a 42 year old female  with history of  HLD, nephrolithiasis presented today for nausea.\par

## 2022-12-22 NOTE — PHYSICAL EXAM
[de-identified] : WDWN in NAD\par HEENT:  unremarkable\par Neck:  supple, no JVD, no LN\par Lungs:  CTA B/L, no W/R/R\par Heart:  Reg rate, +S1S2, no M/R/G\par Abdomen:  soft, NT, ND, +BS, no masses, no HS-megaly\par Genital: No pubic or genital lesions noted.\par Ext:  no C/C/E\par Neuro:  no focal deficits

## 2022-12-23 ENCOUNTER — APPOINTMENT (OUTPATIENT)
Dept: INTERNAL MEDICINE | Facility: CLINIC | Age: 42
End: 2022-12-23

## 2023-01-05 ENCOUNTER — APPOINTMENT (OUTPATIENT)
Dept: INTERNAL MEDICINE | Facility: CLINIC | Age: 43
End: 2023-01-05
Payer: COMMERCIAL

## 2023-01-05 VITALS
DIASTOLIC BLOOD PRESSURE: 80 MMHG | HEART RATE: 62 BPM | BODY MASS INDEX: 19.49 KG/M2 | WEIGHT: 110 LBS | SYSTOLIC BLOOD PRESSURE: 120 MMHG | HEIGHT: 63 IN | OXYGEN SATURATION: 98 %

## 2023-01-05 LAB — S PYO AG SPEC QL IA: NORMAL

## 2023-01-05 PROCEDURE — 87880 STREP A ASSAY W/OPTIC: CPT | Mod: QW

## 2023-01-05 PROCEDURE — 99214 OFFICE O/P EST MOD 30 MIN: CPT

## 2023-01-05 NOTE — ASSESSMENT
[FreeTextEntry1] : BIANCA BUENROSTRO  is a 42 year old female  with history of  HLD, nephrolithiasis, HPV presented today for sore throat. \par \par # throat pain after exposure to strep\par rapid strep: negative. \par Check throat culture, COVID/RSV/FLU.\par Recommended Oral gargling, plenty of oral fluid, Tylenol and rest. \par \par f/u result.

## 2023-01-05 NOTE — HISTORY OF PRESENT ILLNESS
[FreeTextEntry8] : BIANCA BUENROSTRO  is a 42 year old female  with history of  HLD, nephrolithiasis, HPV presented today for sore throat. \par Pt's kids tested positive for strep about 10 days ago. Pt is working as a dentist.\par -COVID vaccine: initial 2 doses and got 2 boosters including bivariant.\par -Flu shot: updated for this season.\par throat pain for 5 days. no other sx. never had fever or neck lymph node swelling. \par SPO2 98%, HR 66 , temp: 97.2\par Denied fever, chills,CP, SOB, abdominal pain, n/v/c/d.

## 2023-01-05 NOTE — PHYSICAL EXAM
[Normal Sclera/Conjunctiva] : normal sclera/conjunctiva [PERRL] : pupils equal round and reactive to light [Normal Outer Ear/Nose] : the outer ears and nose were normal in appearance [Normal TMs] : both tympanic membranes were normal [No Lymphadenopathy] : no lymphadenopathy [de-identified] : WDWN in NAD\par HEENT:  unremarkable\par Neck:  supple, no JVD, no LN\par Lungs:  CTA B/L, no W/R/R\par Heart:  Reg rate, +S1S2, no M/R/G\par Abdomen:  soft, NT, ND, +BS, no masses, no HS-megaly\par Genital: No pubic or genital lesions noted.\par Ext:  no C/C/E\par Neuro:  no focal deficits [de-identified] : mild throat redness, no exudate or white spot on tongue or tonsils. geographic tongue( chronic condition)

## 2023-01-06 LAB
INFLUENZA A RESULT: NOT DETECTED
INFLUENZA B RESULT: NOT DETECTED
RESP SYN VIRUS RESULT: NOT DETECTED
SARS-COV-2 RESULT: NOT DETECTED

## 2023-01-07 ENCOUNTER — TRANSCRIPTION ENCOUNTER (OUTPATIENT)
Age: 43
End: 2023-01-07

## 2023-01-07 LAB — BACTERIA THROAT CULT: NORMAL

## 2023-05-18 NOTE — PHYSICAL EXAM
[No Acute Distress] : no acute distress [Well Nourished] : well nourished [Well Developed] : well developed [Well-Appearing] : well-appearing [Normal Sclera/Conjunctiva] : normal sclera/conjunctiva [PERRL] : pupils equal round and reactive to light [EOMI] : extraocular movements intact [Normal Outer Ear/Nose] : the outer ears and nose were normal in appearance [Normal Oropharynx] : the oropharynx was normal [No JVD] : no jugular venous distention [No Lymphadenopathy] : no lymphadenopathy [Supple] : supple [Thyroid Normal, No Nodules] : the thyroid was normal and there were no nodules present [No Respiratory Distress] : no respiratory distress  [No Accessory Muscle Use] : no accessory muscle use [Clear to Auscultation] : lungs were clear to auscultation bilaterally [Normal Rate] : normal rate  [Regular Rhythm] : with a regular rhythm [Normal S1, S2] : normal S1 and S2 [No Murmur] : no murmur heard [No Carotid Bruits] : no carotid bruits [No Abdominal Bruit] : a ~M bruit was not heard ~T in the abdomen [No Varicosities] : no varicosities [Pedal Pulses Present] : the pedal pulses are present [No Edema] : there was no peripheral edema [No Palpable Aorta] : no palpable aorta [No Extremity Clubbing/Cyanosis] : no extremity clubbing/cyanosis [Normal Appearance] : normal in appearance [No Nipple Discharge] : no nipple discharge [No Axillary Lymphadenopathy] : no axillary lymphadenopathy [Soft] : abdomen soft [Non Tender] : non-tender [Non-distended] : non-distended [No Masses] : no abdominal mass palpated [No HSM] : no HSM [Normal Bowel Sounds] : normal bowel sounds [No CVA Tenderness] : no CVA  tenderness [No Spinal Tenderness] : no spinal tenderness [No Joint Swelling] : no joint swelling [Grossly Normal Strength/Tone] : grossly normal strength/tone [No Rash] : no rash [Coordination Grossly Intact] : coordination grossly intact [No Focal Deficits] : no focal deficits [Normal Gait] : normal gait [Deep Tendon Reflexes (DTR)] : deep tendon reflexes were 2+ and symmetric [Normal Affect] : the affect was normal [Normal Insight/Judgement] : insight and judgment were intact [Normal] : affect was normal and insight and judgment were intact

## 2023-05-19 ENCOUNTER — APPOINTMENT (OUTPATIENT)
Dept: INTERNAL MEDICINE | Facility: CLINIC | Age: 43
End: 2023-05-19
Payer: COMMERCIAL

## 2023-05-19 VITALS
WEIGHT: 114 LBS | BODY MASS INDEX: 20.2 KG/M2 | SYSTOLIC BLOOD PRESSURE: 102 MMHG | DIASTOLIC BLOOD PRESSURE: 68 MMHG | HEART RATE: 60 BPM | OXYGEN SATURATION: 99 % | HEIGHT: 63 IN

## 2023-05-19 DIAGNOSIS — Z87.09 PERSONAL HISTORY OF OTHER DISEASES OF THE RESPIRATORY SYSTEM: ICD-10-CM

## 2023-05-19 PROCEDURE — 99396 PREV VISIT EST AGE 40-64: CPT

## 2023-05-19 NOTE — REVIEW OF SYSTEMS
[Nasal Discharge] : nasal discharge [Negative] : Heme/Lymph [FreeTextEntry4] : occas congestion due to allergies

## 2023-05-19 NOTE — HISTORY OF PRESENT ILLNESS
[de-identified] : Last seen one year ago Patient has been generally well without any significant intercurrent issues or problems. Adherent with medications as noted without side effects. Appetite good and weight reportedly stable. Active with good exercise tolerance. No sx of chest pain, sob, palpitations, orthopnea, PND, PIEDRA, edema, lightheadedness..\par \par

## 2023-05-19 NOTE — ASSESSMENT
[FreeTextEntry1] : \par 1. HCM\par  immunizations ok- had Tdap at daughter's pediatrician in 3/12- and with last pregnancy 2013\par  pap smear recently-h/o HPV- since teenage years- reports neg PAP and HPV 3/19- Dr Denney- and again June 2022- appt sched\par  derm f/u routine every summer\par Mammo- Gyn ordered since aged 39- advised yearly til aged 52- order placed- last Aug 2022- has appt \par  \par 2. Kidney stones- ? Medullary Sponge Kidney- isue discussed Has been asx Does drink a lot of water, etc- monitor- was advised sono but not interested in f/u\par \par f/u one year or prn- will order labs and call when results received\par \par

## 2023-05-19 NOTE — HEALTH RISK ASSESSMENT
[Excellent] : ~his/her~  mood as  excellent [4 or more  times a week (4 pts)] : 4 or more  times a week (4 points) [1 or 2 (0 pts)] : 1 or 2 (0 points) [Never (0 pts)] : Never (0 points) [No] : In the past 12 months have you used drugs other than those required for medical reasons? No [No falls in past year] : Patient reported no falls in the past year [0] : 2) Feeling down, depressed, or hopeless: Not at all (0) [PHQ-2 Negative - No further assessment needed] : PHQ-2 Negative - No further assessment needed [Patient reported PAP Smear was normal] : Patient reported PAP Smear was normal [HIV test declined] : HIV test declined [Hepatitis C test declined] : Hepatitis C test declined [None] : None [With Family] : lives with family [Graduate School] : graduate school [# Of Children ___] : has [unfilled] children [Sexually Active] : sexually active [Feels Safe at Home] : Feels safe at home [Fully functional (bathing, dressing, toileting, transferring, walking, feeding)] : Fully functional (bathing, dressing, toileting, transferring, walking, feeding) [Fully functional (using the telephone, shopping, preparing meals, housekeeping, doing laundry, using] : Fully functional and needs no help or supervision to perform IADLs (using the telephone, shopping, preparing meals, housekeeping, doing laundry, using transportation, managing medications and managing finances) [Reports normal functional visual acuity (ie: able to read med bottle)] : Reports normal functional visual acuity [Smoke Detector] : smoke detector [Seat Belt] :  uses seat belt [With Patient/Caregiver] : , with patient/caregiver [Reviewed no changes] : Reviewed, no changes [Designated Healthcare Proxy] : Designated healthcare proxy [Name: ___] : Health Care Proxy's Name: [unfilled]  [Time Spent: ___ minutes] : Time Spent: [unfilled] minutes [Yes] : Yes [Never] : Never [FreeTextEntry1] : None [de-identified] : No ED or hosp or urgicenter [de-identified] : Gyn- Dr Denney June 2022 [de-identified] : occasional glass of wine at night- up to once nightly [de-identified] : [employer]- daily 30 minutes [de-identified] : Healthy- weight stable by report- [[id number]] : 0 [Change in mental status noted] : No change in mental status noted [Language] : denies difficulty with language [Behavior] : denies difficulty with behavior [Learning/Retaining New Information] : denies difficulty learning/retaining new information [Handling Complex Tasks] : denies difficulty handling complex tasks [Reasoning] : denies difficulty with reasoning [Spatial Ability and Orientation] : denies difficulty with spatial ability and orientation [Reports changes in hearing] : Reports no changes in hearing [Reports changes in vision] : Reports no changes in vision [Reports changes in dental health] : Reports no changes in dental health [Guns at Home] : no guns at home [Travel to Developing Areas] : does not  travel to developing areas [MammogramComments] : n/a [PapSmearComments] : June 2020- Dr Denney [BoneDensityComments] : n/a [ColonoscopyComments] : n/a [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

## 2023-05-22 ENCOUNTER — TRANSCRIPTION ENCOUNTER (OUTPATIENT)
Age: 43
End: 2023-05-22

## 2023-05-22 LAB
ALBUMIN SERPL ELPH-MCNC: 4.6 G/DL
ALP BLD-CCNC: 38 U/L
ALT SERPL-CCNC: 12 U/L
ANION GAP SERPL CALC-SCNC: 12 MMOL/L
AST SERPL-CCNC: 18 U/L
BILIRUB SERPL-MCNC: 0.5 MG/DL
BUN SERPL-MCNC: 14 MG/DL
CALCIUM SERPL-MCNC: 10 MG/DL
CHLORIDE SERPL-SCNC: 104 MMOL/L
CHOLEST SERPL-MCNC: 187 MG/DL
CO2 SERPL-SCNC: 22 MMOL/L
CREAT SERPL-MCNC: 1.04 MG/DL
EGFR: 70 ML/MIN/1.73M2
ESTIMATED AVERAGE GLUCOSE: 94 MG/DL
GLUCOSE SERPL-MCNC: 90 MG/DL
HBA1C MFR BLD HPLC: 4.9 %
HDLC SERPL-MCNC: 70 MG/DL
LDLC SERPL CALC-MCNC: 103 MG/DL
NONHDLC SERPL-MCNC: 117 MG/DL
POTASSIUM SERPL-SCNC: 4.7 MMOL/L
PROT SERPL-MCNC: 6.9 G/DL
SODIUM SERPL-SCNC: 137 MMOL/L
T4 FREE SERPL-MCNC: 1.2 NG/DL
TRIGL SERPL-MCNC: 68 MG/DL
TSH SERPL-ACNC: 1.32 UIU/ML

## 2023-05-23 ENCOUNTER — TRANSCRIPTION ENCOUNTER (OUTPATIENT)
Age: 43
End: 2023-05-23

## 2023-06-28 ENCOUNTER — TRANSCRIPTION ENCOUNTER (OUTPATIENT)
Age: 43
End: 2023-06-28

## 2023-07-11 ENCOUNTER — APPOINTMENT (OUTPATIENT)
Dept: OBGYN | Facility: CLINIC | Age: 43
End: 2023-07-11
Payer: COMMERCIAL

## 2023-07-11 VITALS
BODY MASS INDEX: 19.84 KG/M2 | SYSTOLIC BLOOD PRESSURE: 100 MMHG | WEIGHT: 112 LBS | DIASTOLIC BLOOD PRESSURE: 68 MMHG | HEIGHT: 63 IN

## 2023-07-11 PROCEDURE — 99386 PREV VISIT NEW AGE 40-64: CPT

## 2023-07-11 NOTE — PLAN
[FreeTextEntry1] : 42 year old female presents for an annual\par \par -PAP done today\par -Rx for breast mammo/sono\par -Rx for Junel\par \par RTO in 1 year

## 2023-07-11 NOTE — PHYSICAL EXAM
[Appropriately responsive] : appropriately responsive [Alert] : alert [No Acute Distress] : no acute distress [Soft] : soft [Non-tender] : non-tender [Non-distended] : non-distended [No HSM] : No HSM [No Lesions] : no lesions [No Mass] : no mass [Oriented x3] : oriented x3 [Examination Of The Breasts] : a normal appearance [No Masses] : no breast masses were palpable [Labia Majora] : normal [Labia Minora] : normal [Normal] : normal [Uterine Adnexae] : normal [Scant] : There was scant vaginal bleeding

## 2023-07-11 NOTE — HISTORY OF PRESENT ILLNESS
[FreeTextEntry1] : 42 year old female presents for an annual. LMP 23. Reports mild hot flashes. PCP took her off OCP, period are now heavy and pt would like to restart OCP.\par \par OBHx:  x2(, )\par GYNHx: Gardasil vaccine - early s, h/o abnml pap, h/o HPV+\par \par Current Meds: Vitamin C\par Allergies to pollen and animal dander\par \par FamHx: HTN - mother / pancreatic cancer -father [Mammogramdate] : 07/22 [BreastSonogramDate] : 07/22 [PapSmeardate] : 06/22

## 2023-07-13 LAB — HPV HIGH+LOW RISK DNA PNL CVX: NOT DETECTED

## 2023-07-19 LAB — CYTOLOGY CVX/VAG DOC THIN PREP: NORMAL

## 2024-05-31 ENCOUNTER — OUTPATIENT (OUTPATIENT)
Dept: OUTPATIENT SERVICES | Facility: HOSPITAL | Age: 44
LOS: 1 days | End: 2024-05-31
Payer: COMMERCIAL

## 2024-05-31 ENCOUNTER — APPOINTMENT (OUTPATIENT)
Dept: INTERNAL MEDICINE | Facility: CLINIC | Age: 44
End: 2024-05-31
Payer: COMMERCIAL

## 2024-05-31 VITALS
SYSTOLIC BLOOD PRESSURE: 100 MMHG | BODY MASS INDEX: 20.02 KG/M2 | DIASTOLIC BLOOD PRESSURE: 60 MMHG | HEART RATE: 58 BPM | OXYGEN SATURATION: 98 % | HEIGHT: 63 IN | WEIGHT: 113 LBS

## 2024-05-31 VITALS — DIASTOLIC BLOOD PRESSURE: 70 MMHG | SYSTOLIC BLOOD PRESSURE: 110 MMHG

## 2024-05-31 DIAGNOSIS — Z23 ENCOUNTER FOR IMMUNIZATION: ICD-10-CM

## 2024-05-31 DIAGNOSIS — N20.0 CALCULUS OF KIDNEY: ICD-10-CM

## 2024-05-31 DIAGNOSIS — Z01.419 ENCOUNTER FOR GYNECOLOGICAL EXAMINATION (GENERAL) (ROUTINE) W/OUT ABNORMAL FINDINGS: ICD-10-CM

## 2024-05-31 DIAGNOSIS — E78.5 HYPERLIPIDEMIA, UNSPECIFIED: ICD-10-CM

## 2024-05-31 DIAGNOSIS — B97.7 PAPILLOMAVIRUS AS THE CAUSE OF DISEASES CLASSIFIED ELSEWHERE: ICD-10-CM

## 2024-05-31 DIAGNOSIS — I10 ESSENTIAL (PRIMARY) HYPERTENSION: ICD-10-CM

## 2024-05-31 DIAGNOSIS — Z00.00 ENCOUNTER FOR GENERAL ADULT MEDICAL EXAMINATION W/OUT ABNORMAL FINDINGS: ICD-10-CM

## 2024-05-31 LAB
ALBUMIN SERPL ELPH-MCNC: 4.6 G/DL
ALP BLD-CCNC: 39 U/L
ALT SERPL-CCNC: 13 U/L
ANION GAP SERPL CALC-SCNC: 11 MMOL/L
AST SERPL-CCNC: 17 U/L
BILIRUB SERPL-MCNC: 0.5 MG/DL
BUN SERPL-MCNC: 14 MG/DL
CALCIUM SERPL-MCNC: 10.1 MG/DL
CHLORIDE SERPL-SCNC: 105 MMOL/L
CHOLEST SERPL-MCNC: 204 MG/DL
CO2 SERPL-SCNC: 24 MMOL/L
CREAT SERPL-MCNC: 0.89 MG/DL
EGFR: 83 ML/MIN/1.73M2
GLUCOSE SERPL-MCNC: 90 MG/DL
HCT VFR BLD CALC: 37.4 %
HDLC SERPL-MCNC: 71 MG/DL
HGB BLD-MCNC: 12.8 G/DL
LDLC SERPL CALC-MCNC: 124 MG/DL
MCHC RBC-ENTMCNC: 30.8 PG
MCHC RBC-ENTMCNC: 34.2 GM/DL
MCV RBC AUTO: 90.1 FL
NONHDLC SERPL-MCNC: 133 MG/DL
PLATELET # BLD AUTO: 213 K/UL
POTASSIUM SERPL-SCNC: 4.8 MMOL/L
PROT SERPL-MCNC: 7.2 G/DL
RBC # BLD: 4.15 M/UL
RBC # FLD: 13.3 %
SODIUM SERPL-SCNC: 141 MMOL/L
TRIGL SERPL-MCNC: 47 MG/DL
TSH SERPL-ACNC: 1.03 UIU/ML
WBC # FLD AUTO: 4.99 K/UL

## 2024-05-31 PROCEDURE — 99396 PREV VISIT EST AGE 40-64: CPT

## 2024-05-31 PROCEDURE — G0463: CPT

## 2024-05-31 RX ORDER — NORETHINDRONE ACETATE AND ETHINYL ESTRADIOL 1; 20 MG/1; UG/1
1-20 TABLET ORAL DAILY
Qty: 84 | Refills: 3 | Status: DISCONTINUED | COMMUNITY
Start: 2023-07-11 | End: 2024-05-31

## 2024-05-31 RX ORDER — NORETHINDRONE ACETATE AND ETHINYL ESTRADIOL 1; 20 MG/1; UG/1
1-20 TABLET ORAL DAILY
Refills: 0 | Status: DISCONTINUED | COMMUNITY
Start: 2023-05-19 | End: 2024-05-31

## 2024-05-31 NOTE — PAST MEDICAL HISTORY
[Menarche Age ____] : age at menarche was [unfilled] [Regular Cycle Intervals] : have been regular [Total Preg ___] : G[unfilled]

## 2024-05-31 NOTE — REVIEW OF SYSTEMS
[Nasal Discharge] : nasal discharge [FreeTextEntry4] : occas congestion due to allergies [Negative] : ENT

## 2024-06-03 ENCOUNTER — TRANSCRIPTION ENCOUNTER (OUTPATIENT)
Age: 44
End: 2024-06-03

## 2024-06-03 LAB — HBV SURFACE AB SER QL: REACTIVE

## 2024-06-03 NOTE — HEALTH RISK ASSESSMENT
[Excellent] : ~his/her~  mood as  excellent [Yes] : Yes [4 or more  times a week (4 pts)] : 4 or more  times a week (4 points) [1 or 2 (0 pts)] : 1 or 2 (0 points) [No falls in past year] : Patient reported no falls in the past year [0] : 2) Feeling down, depressed, or hopeless: Not at all (0) [PHQ-2 Negative - No further assessment needed] : PHQ-2 Negative - No further assessment needed [de-identified] : occasional glass of wine at night- up to once nightly [Never] : Never [Patient reported PAP Smear was normal] : Patient reported PAP Smear was normal [HIV test declined] : HIV test declined [Hepatitis C test declined] : Hepatitis C test declined [None] : None [With Family] : lives with family [Graduate School] : graduate school [# Of Children ___] : has [unfilled] children [Sexually Active] : sexually active [Feels Safe at Home] : Feels safe at home [Fully functional (bathing, dressing, toileting, transferring, walking, feeding)] : Fully functional (bathing, dressing, toileting, transferring, walking, feeding) [Fully functional (using the telephone, shopping, preparing meals, housekeeping, doing laundry, using] : Fully functional and needs no help or supervision to perform IADLs (using the telephone, shopping, preparing meals, housekeeping, doing laundry, using transportation, managing medications and managing finances) [Reports normal functional visual acuity (ie: able to read med bottle)] : Reports normal functional visual acuity [Smoke Detector] : smoke detector [Seat Belt] :  uses seat belt [With Patient/Caregiver] : , with patient/caregiver [Reviewed no changes] : Reviewed, no changes [Designated Healthcare Proxy] : Designated healthcare proxy [Name: ___] : Health Care Proxy's Name: [unfilled]  [Time Spent: ___ minutes] : Time Spent: [unfilled] minutes [No] : No [Never (0 pts)] : Never (0 points) [FreeTextEntry1] : None [de-identified] : No ED or hosp or urgicenter [de-identified] : Gyn- Dr Levy July 2023- neg PAP and HPV; Derm and optho [Audit-CScore] : 0 [de-identified] : [employer]- daily 30 minutes [FBQ4Vhxem] : 0 [de-identified] : Healthy- weight stable by report- [Change in mental status noted] : No change in mental status noted [Language] : denies difficulty with language [Behavior] : denies difficulty with behavior [Learning/Retaining New Information] : denies difficulty learning/retaining new information [Handling Complex Tasks] : denies difficulty handling complex tasks [Reasoning] : denies difficulty with reasoning [Spatial Ability and Orientation] : denies difficulty with spatial ability and orientation [Reports changes in hearing] : Reports no changes in hearing [Reports changes in vision] : Reports no changes in vision [Reports changes in dental health] : Reports no changes in dental health [Guns at Home] : no guns at home [Travel to Developing Areas] : does not  travel to developing areas [MammogramComments] : n/a [PapSmearComments] : July 2023 Dr Levy [BoneDensityComments] : n/a [ColonoscopyComments] : n/a [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

## 2024-06-03 NOTE — HISTORY OF PRESENT ILLNESS
[de-identified] : Last seen on year ago. Patient has been generally well without any significant intercurrent issues or problems. Adherent with medications as noted without side effects. Appetite good and weight reportedly stable. Active with good exercise tolerance. No sx of chest pain, sob, palpitations, orthopnea, PND, PIEDRA, edema, lightheadedness..

## 2024-06-03 NOTE — ASSESSMENT
[FreeTextEntry1] : 1. 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 Kidney stones- ? Medullary Sponge Kidney- issue discussed Has been asx Does drink a lot of water, etc- monitor- was advised sono but not interested in f/u until now  f/u one year or prn- will order labs and call when results received and will schedule renal sono  6/3- Called pt and LM and sent [facility] note- all labs ok- immune to Hep B- chol slightly elevated with  but with very low CV risk and healthy lifestyle Plan as above Mammo/son oand blaise meek

## 2024-06-10 ENCOUNTER — TRANSCRIPTION ENCOUNTER (OUTPATIENT)
Age: 44
End: 2024-06-10

## 2024-06-10 DIAGNOSIS — E78.5 HYPERLIPIDEMIA, UNSPECIFIED: ICD-10-CM

## 2024-06-10 DIAGNOSIS — B97.7 PAPILLOMAVIRUS AS THE CAUSE OF DISEASES CLASSIFIED ELSEWHERE: ICD-10-CM

## 2024-06-10 DIAGNOSIS — Z00.00 ENCOUNTER FOR GENERAL ADULT MEDICAL EXAMINATION WITHOUT ABNORMAL FINDINGS: ICD-10-CM

## 2024-06-10 DIAGNOSIS — N20.0 CALCULUS OF KIDNEY: ICD-10-CM

## 2024-06-11 ENCOUNTER — TRANSCRIPTION ENCOUNTER (OUTPATIENT)
Age: 44
End: 2024-06-11

## 2024-07-05 ENCOUNTER — APPOINTMENT (OUTPATIENT)
Dept: ULTRASOUND IMAGING | Facility: CLINIC | Age: 44
End: 2024-07-05
Payer: COMMERCIAL

## 2024-07-05 ENCOUNTER — OUTPATIENT (OUTPATIENT)
Dept: OUTPATIENT SERVICES | Facility: HOSPITAL | Age: 44
LOS: 1 days | End: 2024-07-05
Payer: COMMERCIAL

## 2024-07-05 DIAGNOSIS — Z00.8 ENCOUNTER FOR OTHER GENERAL EXAMINATION: ICD-10-CM

## 2024-07-05 PROCEDURE — 76775 US EXAM ABDO BACK WALL LIM: CPT

## 2024-07-05 PROCEDURE — 76775 US EXAM ABDO BACK WALL LIM: CPT | Mod: 26

## 2024-07-10 ENCOUNTER — TRANSCRIPTION ENCOUNTER (OUTPATIENT)
Age: 44
End: 2024-07-10

## 2024-07-11 ENCOUNTER — APPOINTMENT (OUTPATIENT)
Dept: OBGYN | Facility: CLINIC | Age: 44
End: 2024-07-11
Payer: COMMERCIAL

## 2024-07-11 VITALS
WEIGHT: 113 LBS | HEIGHT: 63 IN | SYSTOLIC BLOOD PRESSURE: 126 MMHG | BODY MASS INDEX: 20.02 KG/M2 | DIASTOLIC BLOOD PRESSURE: 85 MMHG | HEART RATE: 70 BPM

## 2024-07-11 DIAGNOSIS — Z01.419 ENCOUNTER FOR GYNECOLOGICAL EXAMINATION (GENERAL) (ROUTINE) W/OUT ABNORMAL FINDINGS: ICD-10-CM

## 2024-07-11 PROCEDURE — 99396 PREV VISIT EST AGE 40-64: CPT

## 2024-07-11 RX ORDER — LEVONORGESTREL AND ETHINYL ESTRADIOL 0.1-0.02MG
0.1-2 KIT ORAL
Qty: 3 | Refills: 3 | Status: ACTIVE | COMMUNITY
Start: 2024-07-11 | End: 1900-01-01

## 2024-07-12 LAB — HPV HIGH+LOW RISK DNA PNL CVX: NOT DETECTED

## 2024-07-16 LAB — CYTOLOGY CVX/VAG DOC THIN PREP: NORMAL

## 2024-08-29 ENCOUNTER — TRANSCRIPTION ENCOUNTER (OUTPATIENT)
Age: 44
End: 2024-08-29

## 2024-09-09 ENCOUNTER — APPOINTMENT (OUTPATIENT)
Dept: NEPHROLOGY | Facility: CLINIC | Age: 44
End: 2024-09-09
Payer: COMMERCIAL

## 2024-09-09 VITALS
DIASTOLIC BLOOD PRESSURE: 76 MMHG | OXYGEN SATURATION: 99 % | HEIGHT: 63 IN | SYSTOLIC BLOOD PRESSURE: 116 MMHG | BODY MASS INDEX: 21.09 KG/M2 | TEMPERATURE: 97.3 F | WEIGHT: 119.05 LBS | HEART RATE: 58 BPM

## 2024-09-09 DIAGNOSIS — Q61.5 MEDULLARY CYSTIC KIDNEY: ICD-10-CM

## 2024-09-09 LAB
25(OH)D3 SERPL-MCNC: 42.1 NG/ML
APPEARANCE: CLEAR
BACTERIA: ABNORMAL /HPF
BILIRUBIN URINE: NEGATIVE
BLOOD URINE: NEGATIVE
CALCIUM SERPL-MCNC: 10 MG/DL
CAST: 0 /LPF
COLOR: YELLOW
CREAT SPEC-SCNC: 53 MG/DL
CREAT/PROT UR: 0.1 RATIO
EPITHELIAL CELLS: 5 /HPF
GLUCOSE QUALITATIVE U: NEGATIVE MG/DL
KETONES URINE: NEGATIVE MG/DL
LEUKOCYTE ESTERASE URINE: NEGATIVE
MICROSCOPIC-UA: NORMAL
NITRITE URINE: NEGATIVE
PARATHYROID HORMONE INTACT: 24 PG/ML
PH URINE: 6.5
PROT UR-MCNC: 6 MG/DL
PROTEIN URINE: NEGATIVE MG/DL
RED BLOOD CELLS URINE: 3 /HPF
SPECIFIC GRAVITY URINE: 1.01
URATE SERPL-MCNC: 5.4 MG/DL
UROBILINOGEN URINE: 0.2 MG/DL
WHITE BLOOD CELLS URINE: 0 /HPF

## 2024-09-09 PROCEDURE — G2211 COMPLEX E/M VISIT ADD ON: CPT | Mod: NC

## 2024-09-09 PROCEDURE — 99205 OFFICE O/P NEW HI 60 MIN: CPT

## 2024-09-09 NOTE — HISTORY OF PRESENT ILLNESS
[FreeTextEntry1] : 44F with MSK here to establish care.   Pt was first diagnosed on imaging in 2019 after having flank pain. She had an imaging study and saw Dr. Jesus at that time. She completed a 24hr UA and was told not to worry about it. She lost follow up during the follow up and recently saw Dr. Joe her PCP who repeated an US in July and it was consistent with MSK. She was told to follow up with nephrology.  When she was 37 had a mammo bc of a tactile lump but it was negative. Was advised to start using certain vitamins including calcium citrate by GYN for general bone health.  She has never had a stone. No blood in the urine, no nausea, vomiting or diarrhea. No burning on urination. No bubbly or foamy urine. No LE swelling.

## 2024-09-09 NOTE — PLAN
[TextEntry] : WIll check labs today to eval for stone disease. She completed a 24hr UA many years ago, will repeat one now.  Will screen for proteinuria today. Citrate may be helpful in alkalizing urine in this case. Cont all meds for now.  Will follow up via virtual or in person visit to review 24 hr UA and labs once completed.   All questions answered on this visit.

## 2024-09-09 NOTE — PHYSICAL EXAM
[General Appearance - Alert] : alert [General Appearance - In No Acute Distress] : in no acute distress [Sclera] : the sclera and conjunctiva were normal [PERRL With Normal Accommodation] : pupils were equal in size, round, and reactive to light [Extraocular Movements] : extraocular movements were intact [Outer Ear] : the ears and nose were normal in appearance [Oropharynx] : the oropharynx was normal [Neck Appearance] : the appearance of the neck was normal [Neck Cervical Mass (___cm)] : no neck mass was observed [Jugular Venous Distention Increased] : there was no jugular-venous distention [Thyroid Diffuse Enlargement] : the thyroid was not enlarged [Thyroid Nodule] : there were no palpable thyroid nodules [Auscultation Breath Sounds / Voice Sounds] : lungs were clear to auscultation bilaterally [Heart Rate And Rhythm] : heart rate was normal and rhythm regular [Heart Sounds] : normal S1 and S2 [Heart Sounds Gallop] : no gallops [Murmurs] : no murmurs [Heart Sounds Pericardial Friction Rub] : no pericardial rub [Edema] : there was no peripheral edema [Bowel Sounds] : normal bowel sounds [Abdomen Soft] : soft [Abdomen Tenderness] : non-tender [Abdomen Mass (___ Cm)] : no abdominal mass palpated [Abnormal Walk] : normal gait [Nail Clubbing] : no clubbing  or cyanosis of the fingernails [Musculoskeletal - Swelling] : no joint swelling seen [Motor Tone] : muscle strength and tone were normal [Skin Color & Pigmentation] : normal skin color and pigmentation [Skin Turgor] : normal skin turgor [] : no rash [No Focal Deficits] : no focal deficits [Oriented To Time, Place, And Person] : oriented to person, place, and time [Impaired Insight] : insight and judgment were intact [Affect] : the affect was normal

## 2024-09-09 NOTE — CURRENT MEDS
[TextEntry] : Vitamin D 2000iu po qd Calcium Citrate with Magnesium 1000/500mg Vit B complex Vit E Vit C  Zyrtec qd

## 2024-09-17 LAB — VIT A SERPL-MCNC: 47.4 UG/DL

## 2024-10-23 ENCOUNTER — TRANSCRIPTION ENCOUNTER (OUTPATIENT)
Age: 44
End: 2024-10-23

## 2024-10-25 ENCOUNTER — TRANSCRIPTION ENCOUNTER (OUTPATIENT)
Age: 44
End: 2024-10-25

## 2024-11-07 ENCOUNTER — LABORATORY RESULT (OUTPATIENT)
Age: 44
End: 2024-11-07

## 2024-11-07 ENCOUNTER — NON-APPOINTMENT (OUTPATIENT)
Age: 44
End: 2024-11-07

## 2024-11-07 DIAGNOSIS — R10.2 PELVIC AND PERINEAL PAIN: ICD-10-CM

## 2024-11-07 DIAGNOSIS — R30.0 DYSURIA: ICD-10-CM

## 2024-11-08 ENCOUNTER — TRANSCRIPTION ENCOUNTER (OUTPATIENT)
Age: 44
End: 2024-11-08

## 2024-11-08 LAB
APPEARANCE: CLEAR
BILIRUBIN URINE: NEGATIVE
BLOOD URINE: NEGATIVE
COLOR: YELLOW
GLUCOSE QUALITATIVE U: NEGATIVE MG/DL
KETONES URINE: NEGATIVE MG/DL
LEUKOCYTE ESTERASE URINE: ABNORMAL
NITRITE URINE: NEGATIVE
PH URINE: 7.5
PROTEIN URINE: NEGATIVE MG/DL
SPECIFIC GRAVITY URINE: <1.005
UROBILINOGEN URINE: 0.2 MG/DL

## 2024-11-08 RX ORDER — NITROFURANTOIN (MONOHYDRATE/MACROCRYSTALS) 25; 75 MG/1; MG/1
100 CAPSULE ORAL TWICE DAILY
Qty: 10 | Refills: 0 | Status: ACTIVE | COMMUNITY
Start: 2024-11-08 | End: 1900-01-01

## 2024-11-11 ENCOUNTER — TRANSCRIPTION ENCOUNTER (OUTPATIENT)
Age: 44
End: 2024-11-11

## 2024-11-12 ENCOUNTER — TRANSCRIPTION ENCOUNTER (OUTPATIENT)
Age: 44
End: 2024-11-12

## 2025-01-23 ENCOUNTER — TRANSCRIPTION ENCOUNTER (OUTPATIENT)
Age: 45
End: 2025-01-23

## 2025-04-18 ENCOUNTER — TRANSCRIPTION ENCOUNTER (OUTPATIENT)
Age: 45
End: 2025-04-18

## 2025-06-05 PROBLEM — Z01.419 WELL FEMALE EXAM WITH ROUTINE GYNECOLOGICAL EXAM: Status: RESOLVED | Noted: 2023-07-11 | Resolved: 2025-06-05

## 2025-06-05 PROBLEM — R10.2 ACUTE PELVIC PAIN, FEMALE: Status: RESOLVED | Noted: 2024-11-07 | Resolved: 2025-06-05

## 2025-06-05 PROBLEM — Z87.898 HISTORY OF DYSURIA: Status: RESOLVED | Noted: 2024-11-07 | Resolved: 2025-06-05

## 2025-06-06 ENCOUNTER — APPOINTMENT (OUTPATIENT)
Dept: INTERNAL MEDICINE | Facility: CLINIC | Age: 45
End: 2025-06-06
Payer: COMMERCIAL

## 2025-06-06 ENCOUNTER — OUTPATIENT (OUTPATIENT)
Dept: OUTPATIENT SERVICES | Facility: HOSPITAL | Age: 45
LOS: 1 days | End: 2025-06-06
Payer: COMMERCIAL

## 2025-06-06 VITALS
OXYGEN SATURATION: 98 % | HEART RATE: 59 BPM | SYSTOLIC BLOOD PRESSURE: 102 MMHG | DIASTOLIC BLOOD PRESSURE: 60 MMHG | BODY MASS INDEX: 20.38 KG/M2 | WEIGHT: 115 LBS | HEIGHT: 63 IN

## 2025-06-06 DIAGNOSIS — Q61.5 MEDULLARY CYSTIC KIDNEY: ICD-10-CM

## 2025-06-06 DIAGNOSIS — N20.0 CALCULUS OF KIDNEY: ICD-10-CM

## 2025-06-06 DIAGNOSIS — I10 ESSENTIAL (PRIMARY) HYPERTENSION: ICD-10-CM

## 2025-06-06 DIAGNOSIS — R10.2 PELVIC AND PERINEAL PAIN: ICD-10-CM

## 2025-06-06 DIAGNOSIS — Z01.419 ENCOUNTER FOR GYNECOLOGICAL EXAMINATION (GENERAL) (ROUTINE) W/OUT ABNORMAL FINDINGS: ICD-10-CM

## 2025-06-06 DIAGNOSIS — E78.5 HYPERLIPIDEMIA, UNSPECIFIED: ICD-10-CM

## 2025-06-06 DIAGNOSIS — Z87.898 PERSONAL HISTORY OF OTHER SPECIFIED CONDITIONS: ICD-10-CM

## 2025-06-06 DIAGNOSIS — B97.7 PAPILLOMAVIRUS AS THE CAUSE OF DISEASES CLASSIFIED ELSEWHERE: ICD-10-CM

## 2025-06-06 LAB
ALBUMIN SERPL ELPH-MCNC: 4.5 G/DL
ANION GAP SERPL CALC-SCNC: 14 MMOL/L
BUN SERPL-MCNC: 16 MG/DL
CALCIUM SERPL-MCNC: 9.7 MG/DL
CHLORIDE SERPL-SCNC: 104 MMOL/L
CO2 SERPL-SCNC: 21 MMOL/L
CREAT SERPL-MCNC: 0.94 MG/DL
EGFRCR SERPLBLD CKD-EPI 2021: 78 ML/MIN/1.73M2
GLUCOSE SERPL-MCNC: 84 MG/DL
IRON SATN MFR SERPL: 41 %
IRON SERPL-MCNC: 121 UG/DL
LDLC SERPL DIRECT ASSAY-MCNC: 107 MG/DL
PHOSPHATE SERPL-MCNC: 3.1 MG/DL
POTASSIUM SERPL-SCNC: 4.9 MMOL/L
SODIUM SERPL-SCNC: 139 MMOL/L
TIBC SERPL-MCNC: 291 UG/DL
TSH SERPL-ACNC: 1.7 UIU/ML
UIBC SERPL-MCNC: 171 UG/DL

## 2025-06-06 PROCEDURE — G0463: CPT

## 2025-06-06 PROCEDURE — 99396 PREV VISIT EST AGE 40-64: CPT

## 2025-06-09 ENCOUNTER — TRANSCRIPTION ENCOUNTER (OUTPATIENT)
Age: 45
End: 2025-06-09

## 2025-06-09 LAB
APO LP(A) SERPL-MCNC: 29.9 NMOL/L
HCT VFR BLD CALC: 39 %
HGB BLD-MCNC: 12.9 G/DL
MCHC RBC-ENTMCNC: 29.7 PG
MCHC RBC-ENTMCNC: 33.1 G/DL
MCV RBC AUTO: 89.9 FL
PLATELET # BLD AUTO: 208 K/UL
RBC # BLD: 4.34 M/UL
RBC # FLD: 13.2 %
WBC # FLD AUTO: 5.24 K/UL

## 2025-06-10 ENCOUNTER — TRANSCRIPTION ENCOUNTER (OUTPATIENT)
Age: 45
End: 2025-06-10

## 2025-06-19 DIAGNOSIS — Q61.5 MEDULLARY CYSTIC KIDNEY: ICD-10-CM

## 2025-06-19 DIAGNOSIS — B97.7 PAPILLOMAVIRUS AS THE CAUSE OF DISEASES CLASSIFIED ELSEWHERE: ICD-10-CM

## 2025-06-19 DIAGNOSIS — N20.0 CALCULUS OF KIDNEY: ICD-10-CM

## 2025-06-19 DIAGNOSIS — Z83.2 FAMILY HISTORY OF DISEASES OF THE BLOOD AND BLOOD-FORMING ORGANS AND CERTAIN DISORDERS INVOLVING THE IMMUNE MECHANISM: ICD-10-CM

## 2025-06-19 DIAGNOSIS — Z00.00 ENCOUNTER FOR GENERAL ADULT MEDICAL EXAMINATION WITHOUT ABNORMAL FINDINGS: ICD-10-CM

## 2025-06-19 DIAGNOSIS — E78.5 HYPERLIPIDEMIA, UNSPECIFIED: ICD-10-CM

## 2025-08-02 ENCOUNTER — NON-APPOINTMENT (OUTPATIENT)
Age: 45
End: 2025-08-02

## 2025-08-04 ENCOUNTER — APPOINTMENT (OUTPATIENT)
Dept: NEPHROLOGY | Facility: CLINIC | Age: 45
End: 2025-08-04
Payer: COMMERCIAL

## 2025-08-04 VITALS
BODY MASS INDEX: 18.61 KG/M2 | DIASTOLIC BLOOD PRESSURE: 87 MMHG | HEIGHT: 63 IN | SYSTOLIC BLOOD PRESSURE: 122 MMHG | TEMPERATURE: 97.4 F | HEART RATE: 69 BPM | WEIGHT: 105 LBS | OXYGEN SATURATION: 100 %

## 2025-08-04 DIAGNOSIS — Q61.5 MEDULLARY CYSTIC KIDNEY: ICD-10-CM

## 2025-08-04 DIAGNOSIS — N20.0 CALCULUS OF KIDNEY: ICD-10-CM

## 2025-08-04 PROCEDURE — 99215 OFFICE O/P EST HI 40 MIN: CPT

## 2025-08-04 PROCEDURE — G2211 COMPLEX E/M VISIT ADD ON: CPT | Mod: NC

## 2025-08-04 RX ORDER — POTASSIUM CITRATE 10 MEQ/1
10 MEQ TABLET, EXTENDED RELEASE ORAL TWICE DAILY
Qty: 180 | Refills: 3 | Status: ACTIVE | COMMUNITY
Start: 2025-08-04 | End: 1900-01-01

## 2025-08-07 ENCOUNTER — APPOINTMENT (OUTPATIENT)
Dept: OBGYN | Facility: CLINIC | Age: 45
End: 2025-08-07

## 2025-09-11 ENCOUNTER — APPOINTMENT (OUTPATIENT)
Dept: OBGYN | Facility: CLINIC | Age: 45
End: 2025-09-11